# Patient Record
Sex: FEMALE | Race: WHITE | Employment: UNEMPLOYED
[De-identification: names, ages, dates, MRNs, and addresses within clinical notes are randomized per-mention and may not be internally consistent; named-entity substitution may affect disease eponyms.]

---
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
## 2023-01-17 RX ORDER — HEPARIN SODIUM,PORCINE 10 UNIT/ML
2 VIAL (ML) INTRAVENOUS
Status: CANCELLED | OUTPATIENT
Start: 2023-01-17

## 2023-01-17 RX ORDER — COSYNTROPIN 0.25 MG/ML
0.25 INJECTION, POWDER, FOR SOLUTION INTRAMUSCULAR; INTRAVENOUS ONCE
Status: CANCELLED
Start: 2023-01-17 | End: 2023-01-17

## 2023-01-18 ENCOUNTER — INFUSION THERAPY VISIT (OUTPATIENT)
Dept: INFUSION THERAPY | Facility: CLINIC | Age: 18
End: 2023-01-18
Attending: PEDIATRICS
Payer: COMMERCIAL

## 2023-01-18 VITALS
HEART RATE: 72 BPM | WEIGHT: 231.92 LBS | TEMPERATURE: 98 F | SYSTOLIC BLOOD PRESSURE: 131 MMHG | OXYGEN SATURATION: 99 % | RESPIRATION RATE: 16 BRPM | HEIGHT: 69 IN | BODY MASS INDEX: 34.35 KG/M2 | DIASTOLIC BLOOD PRESSURE: 80 MMHG

## 2023-01-18 DIAGNOSIS — E25.9 CAH 21-OH (CONGENITAL ADRENAL HYPERPLASIA), LATE ONSET (H): Primary | ICD-10-CM

## 2023-01-18 DIAGNOSIS — R63.5 WEIGHT GAIN: ICD-10-CM

## 2023-01-18 LAB
ANION GAP SERPL CALCULATED.3IONS-SCNC: 5 MMOL/L (ref 3–14)
CHLORIDE BLD-SCNC: 109 MMOL/L (ref 96–110)
CO2 SERPL-SCNC: 25 MMOL/L (ref 20–32)
CORTIS SERPL-MCNC: 12.1 UG/DL
CORTIS SERPL-MCNC: 15.3 UG/DL
CORTIS SERPL-MCNC: 17.5 UG/DL
CORTIS SERPL-MCNC: 18.5 UG/DL
POTASSIUM BLD-SCNC: 3.9 MMOL/L (ref 3.4–5.3)
SODIUM SERPL-SCNC: 139 MMOL/L (ref 133–144)

## 2023-01-18 PROCEDURE — 96374 THER/PROPH/DIAG INJ IV PUSH: CPT

## 2023-01-18 PROCEDURE — 82024 ASSAY OF ACTH: CPT | Performed by: PEDIATRICS

## 2023-01-18 PROCEDURE — 82533 TOTAL CORTISOL: CPT | Performed by: PEDIATRICS

## 2023-01-18 PROCEDURE — 82374 ASSAY BLOOD CARBON DIOXIDE: CPT | Performed by: PEDIATRICS

## 2023-01-18 PROCEDURE — 84244 ASSAY OF RENIN: CPT | Performed by: PEDIATRICS

## 2023-01-18 PROCEDURE — 36415 COLL VENOUS BLD VENIPUNCTURE: CPT | Performed by: PEDIATRICS

## 2023-01-18 PROCEDURE — 250N000011 HC RX IP 250 OP 636: Performed by: PEDIATRICS

## 2023-01-18 RX ORDER — COSYNTROPIN 0.25 MG/ML
0.25 INJECTION, POWDER, FOR SOLUTION INTRAMUSCULAR; INTRAVENOUS ONCE
Status: COMPLETED | OUTPATIENT
Start: 2023-01-18 | End: 2023-01-18

## 2023-01-18 RX ORDER — DEXTROAMPHETAMINE SACCHARATE, AMPHETAMINE ASPARTATE MONOHYDRATE, DEXTROAMPHETAMINE SULFATE AND AMPHETAMINE SULFATE 2.5; 2.5; 2.5; 2.5 MG/1; MG/1; MG/1; MG/1
10 CAPSULE, EXTENDED RELEASE ORAL ONCE
COMMUNITY
Start: 2022-12-13

## 2023-01-18 RX ORDER — VENLAFAXINE HYDROCHLORIDE 75 MG/1
75 CAPSULE, EXTENDED RELEASE ORAL ONCE
COMMUNITY
Start: 2023-01-09

## 2023-01-18 RX ADMIN — COSYNTROPIN 0.25 MG: 0.25 INJECTION, POWDER, LYOPHILIZED, FOR SOLUTION INTRAVENOUS at 08:14

## 2023-01-18 NOTE — PROGRESS NOTES
Infusion Nursing Note    Louise Blackman Presents to [facility] today for: ACTH Stim Test    Due to :    CAH 21-OH (congenital adrenal hyperplasia), late onset (H)  Weight gain    Intravenous Access/Labs: PIV placed in right AC. Patient declined numbing. Labs drawn as ordered.    Coping:   Child Family Life declined    Infusion Note: Patient in clinic without recent illness or fever. Confirmed did not take hydrocortisone prior to test. Baseline labs drawn, and then cosyntropin administered over less than 1 minute. Labs drawn at +15, +30, and +60. Patient tolerated well, VSS. PIV removed prior to leaving clinic.    Discharge Plan:   father verbalized understanding of discharge instructions. Pt left [facility] in stable condition.

## 2023-01-19 ENCOUNTER — ANCILLARY PROCEDURE (OUTPATIENT)
Dept: BONE DENSITY | Facility: CLINIC | Age: 18
End: 2023-01-19
Attending: PEDIATRICS
Payer: COMMERCIAL

## 2023-01-19 LAB — ACTH PLAS-MCNC: 44 PG/ML

## 2023-01-19 PROCEDURE — 77080 DXA BONE DENSITY AXIAL: CPT | Mod: 26 | Performed by: RADIOLOGY

## 2023-01-19 PROCEDURE — 77080 DXA BONE DENSITY AXIAL: CPT

## 2023-01-21 LAB — RENIN PLAS-CCNC: 1.8 NG/ML/HR

## 2023-01-27 ENCOUNTER — TELEPHONE (OUTPATIENT)
Dept: ENDOCRINOLOGY | Facility: CLINIC | Age: 18
End: 2023-01-27
Payer: COMMERCIAL

## 2023-01-27 NOTE — TELEPHONE ENCOUNTER
LVM requesting call back to set up next appt w/ Dr. Gan, pt due for follow up in the summer, Oct at the latest. Recommended calling back soon as appts are limited.

## 2023-01-31 ENCOUNTER — TELEPHONE (OUTPATIENT)
Dept: ENDOCRINOLOGY | Facility: CLINIC | Age: 18
End: 2023-01-31
Payer: COMMERCIAL

## 2023-02-23 DIAGNOSIS — E25.9 CAH 21-OH (CONGENITAL ADRENAL HYPERPLASIA), LATE ONSET (H): Primary | ICD-10-CM

## 2023-02-28 ENCOUNTER — TELEPHONE (OUTPATIENT)
Dept: OBGYN | Facility: CLINIC | Age: 18
End: 2023-02-28
Payer: COMMERCIAL

## 2023-02-28 NOTE — TELEPHONE ENCOUNTER
----- Message from Ranjana Morataya MD sent at 2/23/2023  8:24 PM CST -----   Review of her labs showed that she does not have adrenal insufficiency and has normal adrenal cortisol production. Her body density is normal.     Her irregular periods and acne can be due to androgen production from the ovaries and/or adrenals.  We will recommend an ultrasound of her ovaries to evaluate for polycystic ovarian disease.     In order to decrease her symptoms caused by the elevated androgen she can start therapy with oral contraceptives and spironolactone, which is a diuretic antagonizing the effect of androgen. If symptoms persist then hydrocortisone can be started in order to eliminate any androgen production from the ovaries.    Please let me know if Louise wants to proceed with OCP and spironolactone. I have entered an order in Epic for pelvic ultrasound.

## 2023-02-28 NOTE — TELEPHONE ENCOUNTER
Tried to reach [facility] but received voicemail.  Left message to call back.    Scheduled follow up in May with Dr Morataya.  Patient is not aware of the date/time yet

## 2023-03-10 ENCOUNTER — TELEPHONE (OUTPATIENT)
Dept: OBGYN | Facility: CLINIC | Age: 18
End: 2023-03-10
Payer: COMMERCIAL

## 2023-03-10 NOTE — TELEPHONE ENCOUNTER
Discussed results as outlined by Dr Morataya.    Louise would be interested in starting OCPs and spironolactone, but already has Kyleena IUD, so wants to be sure that she can take the OCPs without removing the IUD.    She will call to schedule the US as recommended.    ----- Message from Ranjana Morataya MD sent at 2/23/2023  8:24 PM CST -----   Review of her labs showed that she does not have adrenal insufficiency and has normal adrenal cortisol production. Her body density is normal.     Her irregular periods and acne can be due to androgen production from the ovaries and/or adrenals.  We will recommend an ultrasound of her ovaries to evaluate for polycystic ovarian disease.     In order to decrease her symptoms caused by the elevated androgen she can start therapy with oral contraceptives and spironolactone, which is a diuretic antagonizing the effect of androgen. If symptoms persist then hydrocortisone can be started in order to eliminate any androgen production from the ovaries.    Please let me know if Louise wants to proceed with OCP and spironolactone. I have entered an order in Epic for pelvic ultrasound.

## 2023-03-28 DIAGNOSIS — N92.6 IRREGULAR MENSES: ICD-10-CM

## 2023-03-28 DIAGNOSIS — E25.9 CAH 21-OH (CONGENITAL ADRENAL HYPERPLASIA), LATE ONSET (H): Primary | ICD-10-CM

## 2023-03-28 DIAGNOSIS — N94.6 DYSMENORRHEA: ICD-10-CM

## 2023-03-28 RX ORDER — SPIRONOLACTONE 100 MG/1
100 TABLET, FILM COATED ORAL DAILY
Qty: 30 TABLET | Refills: 11 | Status: SHIPPED | OUTPATIENT
Start: 2023-03-28

## 2023-03-28 RX ORDER — DROSPIRENONE AND ETHINYL ESTRADIOL 0.03MG-3MG
1 KIT ORAL DAILY
Qty: 30 TABLET | Refills: 11 | Status: SHIPPED | OUTPATIENT
Start: 2023-03-28

## 2023-03-30 ENCOUNTER — TELEPHONE (OUTPATIENT)
Dept: OBGYN | Facility: CLINIC | Age: 18
End: 2023-03-30
Payer: COMMERCIAL

## 2023-03-30 NOTE — TELEPHONE ENCOUNTER
----- Message from Ranjana Morataya MD sent at 3/28/2023  1:24 AM CDT -----  Efren Serrano,    I prescribed her OCPs to take the active pill daily so she will not have menses.

## 2023-03-30 NOTE — TELEPHONE ENCOUNTER
Tried to reach Louise, but received voicemail.  Left message that prescriptions were sent, and instructed continuous use.  To call back if questions

## 2023-03-30 NOTE — TELEPHONE ENCOUNTER
Left message on voicemail with following:    Yes, she can start OCPs and sprironolactone without removing Kyleena IUD. I entered prescriptions in Epic. I would like to see her for follow up after she has been on OCPs for 6 months.

## 2023-04-12 ENCOUNTER — HOSPITAL ENCOUNTER (OUTPATIENT)
Dept: ULTRASOUND IMAGING | Facility: CLINIC | Age: 18
Discharge: HOME | End: 2023-04-12
Attending: PEDIATRICS | Admitting: PEDIATRICS
Payer: COMMERCIAL

## 2023-04-12 DIAGNOSIS — E25.9 CAH 21-OH (CONGENITAL ADRENAL HYPERPLASIA), LATE ONSET (H): ICD-10-CM

## 2023-04-12 PROCEDURE — 76856 US EXAM PELVIC COMPLETE: CPT

## 2023-04-12 PROCEDURE — 76856 US EXAM PELVIC COMPLETE: CPT | Mod: 26 | Performed by: RADIOLOGY

## 2023-06-06 ENCOUNTER — LAB REQUISITION (OUTPATIENT)
Dept: LAB | Facility: CLINIC | Age: 18
End: 2023-06-06

## 2023-06-06 DIAGNOSIS — Z11.4 ENCOUNTER FOR SCREENING FOR HUMAN IMMUNODEFICIENCY VIRUS (HIV): ICD-10-CM

## 2023-06-06 DIAGNOSIS — Z11.8 ENCOUNTER FOR SCREENING FOR OTHER INFECTIOUS AND PARASITIC DISEASES: ICD-10-CM

## 2023-06-06 DIAGNOSIS — Z11.59 ENCOUNTER FOR SCREENING FOR OTHER VIRAL DISEASES: ICD-10-CM

## 2023-06-06 PROCEDURE — 86780 TREPONEMA PALLIDUM: CPT | Performed by: OBSTETRICS & GYNECOLOGY

## 2023-06-06 PROCEDURE — 87491 CHLMYD TRACH DNA AMP PROBE: CPT | Performed by: OBSTETRICS & GYNECOLOGY

## 2023-06-06 PROCEDURE — 87389 HIV-1 AG W/HIV-1&-2 AB AG IA: CPT | Performed by: OBSTETRICS & GYNECOLOGY

## 2023-06-06 PROCEDURE — 87340 HEPATITIS B SURFACE AG IA: CPT | Performed by: OBSTETRICS & GYNECOLOGY

## 2023-06-07 LAB
C TRACH DNA SPEC QL PROBE+SIG AMP: NEGATIVE
HBV SURFACE AG SERPL QL IA: NONREACTIVE
HIV 1+2 AB+HIV1 P24 AG SERPL QL IA: NONREACTIVE
N GONORRHOEA DNA SPEC QL NAA+PROBE: NEGATIVE
T PALLIDUM AB SER QL: NONREACTIVE

## 2023-12-28 DIAGNOSIS — N94.6 DYSMENORRHEA: ICD-10-CM

## 2023-12-28 DIAGNOSIS — N92.6 IRREGULAR MENSES: ICD-10-CM

## 2023-12-28 DIAGNOSIS — E25.9 CAH 21-OH (CONGENITAL ADRENAL HYPERPLASIA), LATE ONSET (H): ICD-10-CM

## 2023-12-28 RX ORDER — DROSPIRENONE AND ETHINYL ESTRADIOL 0.03MG-3MG
1 KIT ORAL DAILY
Qty: 30 TABLET | Refills: 11 | OUTPATIENT
Start: 2023-12-28

## 2023-12-28 NOTE — TELEPHONE ENCOUNTER
"Last office visit 10/21/2022. Notes reviewed and state:    \"...she can start OCPs and sprironolactone without removing Kyleena IUD. I entered prescriptions in Epic. I would like to see her for follow up after she has been on OCPs for 6 months.\".     Patient had no showed appointments with Dr. Morataya in May and August of 2023.     Attempted to call patient regarding the need for a follow up appointment to continue receiving refills. Left voice message requesting patient call back clinic to make appointment.   "

## 2024-06-25 ENCOUNTER — LAB REQUISITION (OUTPATIENT)
Dept: LAB | Facility: CLINIC | Age: 19
End: 2024-06-25

## 2024-06-25 ENCOUNTER — LAB REQUISITION (OUTPATIENT)
Dept: LAB | Facility: CLINIC | Age: 19
End: 2024-06-25
Payer: COMMERCIAL

## 2024-06-25 DIAGNOSIS — N90.89 OTHER SPECIFIED NONINFLAMMATORY DISORDERS OF VULVA AND PERINEUM: ICD-10-CM

## 2024-06-25 PROCEDURE — 87529 HSV DNA AMP PROBE: CPT | Mod: ORL | Performed by: OBSTETRICS & GYNECOLOGY

## 2024-06-25 PROCEDURE — 87529 HSV DNA AMP PROBE: CPT | Performed by: OBSTETRICS & GYNECOLOGY

## 2024-06-25 PROCEDURE — 86696 HERPES SIMPLEX TYPE 2 TEST: CPT | Performed by: OBSTETRICS & GYNECOLOGY

## 2024-06-26 LAB
HSV1 DNA SPEC QL NAA+PROBE: NOT DETECTED
HSV1 IGG SERPL QL IA: 0.31 INDEX
HSV1 IGG SERPL QL IA: NORMAL
HSV2 DNA SPEC QL NAA+PROBE: DETECTED
HSV2 IGG SERPL QL IA: 0.75 INDEX
HSV2 IGG SERPL QL IA: NORMAL